# Patient Record
Sex: FEMALE | Race: WHITE | NOT HISPANIC OR LATINO | URBAN - NONMETROPOLITAN AREA
[De-identification: names, ages, dates, MRNs, and addresses within clinical notes are randomized per-mention and may not be internally consistent; named-entity substitution may affect disease eponyms.]

---

## 2018-09-24 NOTE — PATIENT DISCUSSION
DERMATOCHALASIS / PTOSIS RUL AND ALONDRA :  VISUALLY SIGNIFICANT TO PATIENT. SCHEDULE WITH OCULOPLASTIC SPECIALIST IF PATIENT DESIRES.

## 2018-09-24 NOTE — PATIENT DISCUSSION
Surgery Counseling:  I have discussed the option of glasses versus cataract surgery versus following, It was explained that when vision no longer meets the patient's visual needs and a new prescription for glasses is not likely to improve the patient's visual symptoms, the option of cataract surgery is a reasonable next step. It was explained that there is no guarantee that removing the cataract will improve their visual symptoms; however, it is believed that the cataract is contributing to the patient's visual impairment and surgery may noticeably improve both the visual and functional status of the patient. After this discussion, the patient desires to proceed with cataract surgery with implantation of an intraocular lens to improve their vision for  watching TV and to drive at night.

## 2018-10-05 NOTE — PATIENT DISCUSSION
New Prescription: Durezol (difluprednate): drops: 0.05% 1 drop three times a day as directed into right eye 10-

## 2018-10-05 NOTE — PATIENT DISCUSSION
New Prescription: Besivance (besifloxacin): drops,suspension: 0.6% 1 drop three times a day as directed into right eye 10-

## 2018-10-31 NOTE — PATIENT DISCUSSION
Continue: Durezol (difluprednate): drops: 0.05% 1 drop three times a day as directed into right eye 10-

## 2018-10-31 NOTE — PATIENT DISCUSSION
Continue: Besivance (besifloxacin): drops,suspension: 0.6% 1 drop three times a day as directed into right eye 10-

## 2018-11-05 NOTE — PATIENT DISCUSSION
*Pre-Op 2nd Eye Counseling: The patient has noticed an improvement in their visual symptoms in the operative eye. The patient complains of decreased vision in the fellow eye when watching TV and driving at night. It was explained to the patient that the decision to proceed with cataract surgery in the fellow eye is entirely a separate decision from the surgical eye. All of the same risks, benefits and alternatives ere reviewed with the patient again. The patient does feel the vision in the non-operative eye is limiting their daily activities and elects to proceed with surgery in the cataract eye.

## 2018-11-05 NOTE — PATIENT DISCUSSION
taper as directed in right eye and Start immediately after surgery in left eye and continue as directed.

## 2018-11-05 NOTE — PATIENT DISCUSSION
continue in right eye until 11/07/2018. Start two days prior to surgery in left eye and continue as directed. no chest pain, no cough, and no shortness of breath.

## 2018-11-05 NOTE — PATIENT DISCUSSION
continue as directed in right eye and Start two days prior to surgery in left eye and continue as directed.

## 2018-11-13 PROBLEM — Z96.1: Noted: 2018-11-13

## 2018-11-13 PROBLEM — H40.1131: Noted: 2018-11-13

## 2018-11-14 NOTE — PATIENT DISCUSSION
Continue: Durezol (difluprednate): drops: 0.05% 1 drop three times a day as directed into both eyes 10-

## 2018-11-14 NOTE — PATIENT DISCUSSION
S/P PC IOL, OS. : GOOD POST OP RESULT. STOP ANTIBIOTIC DROP IN ONE WEEK. CONTINUE OTHER DROPS AS DIRECTED UNTIL FURTHER INSTRUCTION. RETURN FOR FOLLOW-UP IN 2 WEEKS.

## 2018-11-14 NOTE — PATIENT DISCUSSION
Continue: Ilevro (nepafenac): drops,suspension: 0.3% 1 drop every morning as directed into both eyes 10-

## 2018-11-14 NOTE — PATIENT DISCUSSION
Continue: Besivance (besifloxacin): drops,suspension: 0.6% 1 drop three times a day as directed into left eye 10-

## 2018-12-07 NOTE — PATIENT DISCUSSION
Continue: Ilevro (nepafenac): drops,suspension: 0.3% 1 drop every morning as directed into both eyes 11-

## 2019-02-13 ENCOUNTER — IMPORTED ENCOUNTER (OUTPATIENT)
Dept: URBAN - NONMETROPOLITAN AREA CLINIC 1 | Facility: CLINIC | Age: 84
End: 2019-02-13

## 2019-02-13 PROCEDURE — 99212 OFFICE O/P EST SF 10 MIN: CPT

## 2019-02-13 NOTE — PATIENT DISCUSSION
POAG OU - Mild Stage- IOP's OU are adequately controlled under current medical management.- Recommend continue medications without change. - Recommend follow-up in 3 monthsPseudophakia OU s/p YAG Caps OU- Doing well no treatment currently indicated.

## 2019-06-10 ENCOUNTER — IMPORTED ENCOUNTER (OUTPATIENT)
Dept: URBAN - NONMETROPOLITAN AREA CLINIC 1 | Facility: CLINIC | Age: 84
End: 2019-06-10

## 2019-06-10 PROCEDURE — 99212 OFFICE O/P EST SF 10 MIN: CPT

## 2019-06-10 NOTE — PATIENT DISCUSSION
POAG OU - Mild Stage- IOP's OU are adequately controlled under current medical management.- Recommend continue medications without change. - Recommend follow-up in 3 months with OCT-RNFLPseudophakia OU s/p YAG Caps OU- Doing well no treatment currently indicated.; 's Notes: 10/9/19 - OCT 0.77/.078; RNFL 69/67 um

## 2019-09-10 ENCOUNTER — IMPORTED ENCOUNTER (OUTPATIENT)
Dept: URBAN - NONMETROPOLITAN AREA CLINIC 1 | Facility: CLINIC | Age: 84
End: 2019-09-10

## 2019-09-10 PROCEDURE — 99214 OFFICE O/P EST MOD 30 MIN: CPT

## 2019-09-10 PROCEDURE — 92133 CPTRZD OPH DX IMG PST SGM ON: CPT

## 2019-09-10 NOTE — PATIENT DISCUSSION
POAG OU - Mild Stage- IOP's OU are adequately controlled under current medical management.- Recommend continue medications without change. - Recommend follow-up in 3 monthsPseudophakia OU s/p YAG Caps OU- Doing well no treatment currently indicated.; 's Notes: 10/9/18 - OCT 0.77/.078; RNFL 69/67 um9/10/19 - OCT 0.72/0.87 GPA stable OU

## 2020-01-22 ENCOUNTER — IMPORTED ENCOUNTER (OUTPATIENT)
Dept: URBAN - NONMETROPOLITAN AREA CLINIC 1 | Facility: CLINIC | Age: 85
End: 2020-01-22

## 2020-01-22 PROCEDURE — 99212 OFFICE O/P EST SF 10 MIN: CPT

## 2020-01-22 NOTE — PATIENT DISCUSSION
POAG OU - Mild Stage- IOP's OU are borderline adequately controlled OS under current medical management.- Recommend continue medications without change. - Recommend follow-up in 3 months with OCT-RNFL due to slight downward progression in GPA. Pseudophakia OU s/p YAG Caps OU- Doing well no treatment currently indicated.; 's Notes: 10/9/18 - OCT 0.77/.078; RNFL 69/67 um9/10/19 - OCT 0.72/0.87 GPA stable OU

## 2020-06-10 ENCOUNTER — IMPORTED ENCOUNTER (OUTPATIENT)
Dept: URBAN - NONMETROPOLITAN AREA CLINIC 1 | Facility: CLINIC | Age: 85
End: 2020-06-10

## 2020-06-10 PROCEDURE — 99213 OFFICE O/P EST LOW 20 MIN: CPT

## 2020-10-12 ENCOUNTER — IMPORTED ENCOUNTER (OUTPATIENT)
Dept: URBAN - NONMETROPOLITAN AREA CLINIC 1 | Facility: CLINIC | Age: 85
End: 2020-10-12

## 2020-10-12 PROCEDURE — 99214 OFFICE O/P EST MOD 30 MIN: CPT

## 2020-10-12 PROCEDURE — 92133 CPTRZD OPH DX IMG PST SGM ON: CPT

## 2021-04-12 ENCOUNTER — IMPORTED ENCOUNTER (OUTPATIENT)
Dept: URBAN - NONMETROPOLITAN AREA CLINIC 1 | Facility: CLINIC | Age: 86
End: 2021-04-12

## 2021-04-12 PROCEDURE — 99213 OFFICE O/P EST LOW 20 MIN: CPT

## 2021-04-12 NOTE — PATIENT DISCUSSION
POAG OU - Mild Stage- IOP's OU are adequately controlled under current medical management.- Recommend continue medications without change. - Recommend follow-up in 6 months with IOP check and OCT-RNFLPseudophakia OU s/p YAG Caps OU- Doing well no treatment currently indicated.; 's Notes: 10/9/18 - OCT 0.77/.078; RNFL 69/67 um9/10/19 - OCT 0.72/0.87 GPA stable OU

## 2021-11-09 ENCOUNTER — IMPORTED ENCOUNTER (OUTPATIENT)
Dept: URBAN - NONMETROPOLITAN AREA CLINIC 1 | Facility: CLINIC | Age: 86
End: 2021-11-09

## 2021-11-09 PROCEDURE — 92133 CPTRZD OPH DX IMG PST SGM ON: CPT

## 2021-11-09 PROCEDURE — 99214 OFFICE O/P EST MOD 30 MIN: CPT

## 2021-11-09 NOTE — PATIENT DISCUSSION
POAG OUDiscussed diagnosis with patient. Discussed the chronic progressive nature of this disease and various treatment options. Importance of good compliance with medications was emphasized. OCT done today: superior NFL thinning OD and inferior NFL thinning OS. IOP at 15 OU. Cup to disc noted 0.8 OU. Continue Timolol BID OU. Continue to monitor. RTC in 6 months with photos P/C IOL OUDiscusse diagnosis in detail with patient. Both intraocular implants in place and stable. Continue to monitor.; Dr's Notes: 10/9/18 - OCT 0.77/.078; RNFL 69/67 um9/10/19 - OCT 0.72/0.87 GPA stable OU

## 2022-04-09 ASSESSMENT — VISUAL ACUITY
OD_CC: 20/30-
OS_PH: 20/40-1
OD_CC: 20/40-2
OD_CC: 20/40-2
OS_CC: 20/60-
OS_PH: 20/30-
OD_CC: 20/40-1
OS_PH: 20/40
OS_PH: 20/30-2
OS_CC: 20/70-
OD_CC: 20/30-2
OS_CC: 20/80+1
OS_CC: 20/70
OS_CC: 20/40
OD_PH: 20/20-2
OD_CC: 20/30-2
OS_CC: 20/80-1
OS_PH: 20/30-
OD_PH: 20/30
OS_PH: 20/50
OS_CC: 20/50-2
OD_CC: 20/40

## 2022-04-09 ASSESSMENT — TONOMETRY
OD_IOP_MMHG: 13
OS_IOP_MMHG: 15
OD_IOP_MMHG: 15
OS_IOP_MMHG: 15
OS_IOP_MMHG: 15
OD_IOP_MMHG: 16
OD_IOP_MMHG: 15
OD_IOP_MMHG: 15
OD_IOP_MMHG: 14
OS_IOP_MMHG: 13
OS_IOP_MMHG: 17
OS_IOP_MMHG: 16
OD_IOP_MMHG: 15
OD_IOP_MMHG: 13
OS_IOP_MMHG: 15
OS_IOP_MMHG: 16

## 2022-05-10 ENCOUNTER — FOLLOW UP (OUTPATIENT)
Dept: RURAL CLINIC 3 | Facility: CLINIC | Age: 87
End: 2022-05-10

## 2022-05-10 DIAGNOSIS — H40.1131: ICD-10-CM

## 2022-05-10 PROCEDURE — 99214 OFFICE O/P EST MOD 30 MIN: CPT

## 2022-05-10 PROCEDURE — 92250 FUNDUS PHOTOGRAPHY W/I&R: CPT

## 2022-05-10 ASSESSMENT — VISUAL ACUITY
OS_SC: 20/30
OD_SC: 20/25-1

## 2022-05-10 ASSESSMENT — TONOMETRY
OS_IOP_MMHG: 16
OD_IOP_MMHG: 16

## 2022-11-16 ENCOUNTER — FOLLOW UP (OUTPATIENT)
Dept: RURAL CLINIC 3 | Facility: CLINIC | Age: 87
End: 2022-11-16

## 2022-11-16 DIAGNOSIS — H40.1131: ICD-10-CM

## 2022-11-16 PROCEDURE — 99214 OFFICE O/P EST MOD 30 MIN: CPT

## 2022-11-16 PROCEDURE — 92133 CPTRZD OPH DX IMG PST SGM ON: CPT

## 2022-11-16 ASSESSMENT — VISUAL ACUITY: OS_SC: 20/40

## 2022-11-16 ASSESSMENT — TONOMETRY
OD_IOP_MMHG: 16
OS_IOP_MMHG: 16

## 2023-05-17 ENCOUNTER — FOLLOW UP (OUTPATIENT)
Dept: RURAL CLINIC 3 | Facility: CLINIC | Age: 88
End: 2023-05-17

## 2023-05-17 DIAGNOSIS — H40.1131: ICD-10-CM

## 2023-05-17 DIAGNOSIS — H52.4: ICD-10-CM

## 2023-05-17 PROCEDURE — 99214 OFFICE O/P EST MOD 30 MIN: CPT

## 2023-05-17 PROCEDURE — 92015 DETERMINE REFRACTIVE STATE: CPT

## 2023-05-17 PROCEDURE — 92250 FUNDUS PHOTOGRAPHY W/I&R: CPT

## 2023-05-17 ASSESSMENT — VISUAL ACUITY
OD_PH: 20/20
OS_PH: 20/30
OD_SC: 20/70
OS_SC: 20/70

## 2023-05-17 ASSESSMENT — TONOMETRY
OD_IOP_MMHG: 12
OS_IOP_MMHG: 12

## 2023-11-22 ENCOUNTER — FOLLOW UP (OUTPATIENT)
Dept: RURAL CLINIC 3 | Facility: CLINIC | Age: 88
End: 2023-11-22

## 2023-11-22 DIAGNOSIS — H40.1131: ICD-10-CM

## 2023-11-22 PROCEDURE — 92133 CPTRZD OPH DX IMG PST SGM ON: CPT

## 2023-11-22 PROCEDURE — 99214 OFFICE O/P EST MOD 30 MIN: CPT

## 2023-11-22 ASSESSMENT — VISUAL ACUITY
OS_PH: 20/40-2
OD_PH: 20/25-1
OS_SC: 20/50
OD_SC: 20/70

## 2023-11-22 ASSESSMENT — TONOMETRY
OS_IOP_MMHG: 12
OD_IOP_MMHG: 12

## 2024-05-22 ENCOUNTER — FOLLOW UP (OUTPATIENT)
Dept: RURAL CLINIC 3 | Facility: CLINIC | Age: 89
End: 2024-05-22

## 2024-05-22 DIAGNOSIS — H52.4: ICD-10-CM

## 2024-05-22 DIAGNOSIS — H40.1131: ICD-10-CM

## 2024-05-22 PROCEDURE — 92015 DETERMINE REFRACTIVE STATE: CPT

## 2024-05-22 PROCEDURE — 92250 FUNDUS PHOTOGRAPHY W/I&R: CPT

## 2024-05-22 PROCEDURE — 92014 COMPRE OPH EXAM EST PT 1/>: CPT

## 2024-05-22 ASSESSMENT — TONOMETRY
OD_IOP_MMHG: 14
OS_IOP_MMHG: 14

## 2024-05-22 ASSESSMENT — VISUAL ACUITY
OD_PH: 20/40-2
OD_SC: 20/60
OS_PH: 20/30
OS_SC: 20/40

## 2024-11-26 ENCOUNTER — FOLLOW UP (OUTPATIENT)
Dept: RURAL CLINIC 3 | Facility: CLINIC | Age: 89
End: 2024-11-26

## 2024-11-26 DIAGNOSIS — H40.1131: ICD-10-CM

## 2024-11-26 PROCEDURE — 99214 OFFICE O/P EST MOD 30 MIN: CPT

## 2024-11-26 PROCEDURE — 92083 EXTENDED VISUAL FIELD XM: CPT

## 2024-11-26 PROCEDURE — 92133 CPTRZD OPH DX IMG PST SGM ON: CPT

## 2025-05-27 ENCOUNTER — FOLLOW UP (OUTPATIENT)
Age: OVER 89
End: 2025-05-27

## 2025-05-27 DIAGNOSIS — H40.1131: ICD-10-CM

## 2025-05-27 DIAGNOSIS — Z96.1: ICD-10-CM

## 2025-05-27 PROCEDURE — 99214 OFFICE O/P EST MOD 30 MIN: CPT

## 2025-05-27 PROCEDURE — 92250 FUNDUS PHOTOGRAPHY W/I&R: CPT
